# Patient Record
Sex: MALE | Race: WHITE | NOT HISPANIC OR LATINO | URBAN - METROPOLITAN AREA
[De-identification: names, ages, dates, MRNs, and addresses within clinical notes are randomized per-mention and may not be internally consistent; named-entity substitution may affect disease eponyms.]

---

## 2024-04-16 PROBLEM — M26.609 TMJ (TEMPOROMANDIBULAR JOINT DISORDER): Status: ACTIVE | Noted: 2024-04-16

## 2024-04-16 PROBLEM — J01.90 ACUTE SINUSITIS: Status: ACTIVE | Noted: 2024-04-16

## 2024-04-16 PROBLEM — H72.92 TYMPANIC MEMBRANE PERFORATION, LEFT: Status: ACTIVE | Noted: 2024-04-16

## 2024-04-16 PROBLEM — H90.72 MIXED HEARING LOSS OF LEFT EAR: Status: ACTIVE | Noted: 2024-04-16

## 2024-04-16 PROBLEM — H69.92 EUSTACHIAN TUBE DYSFUNCTION, LEFT: Status: ACTIVE | Noted: 2024-04-16

## 2024-04-16 PROBLEM — H74.12: Status: ACTIVE | Noted: 2024-04-16

## 2024-04-26 NOTE — PROGRESS NOTES
Subjective   Patient ID: Samy Mcclure is a 38 y.o. male who presents for No chief complaint on file..  HPI  This 38-year-old male had been followed in this office for a number of years due to eustachian tube difficulties requiring myringotomy tube placements.  Seen by neurotology in 2016 because of the his difficulties with eustachian tube problems.  A perforation of the tympanic membrane on the left-hand side had occurred because of recurrent tube use but self repair..  Surgical scar care had been discussed but was felt at that time to be unnecessary unless problems worsened regarding eustachian tube function. His last audiogram in this office from 2016 revealed evidence on the left-hand side for a mostly moderate mixed hearing loss with previous testing showing intact discrimination ability.  And on the right-hand side last tested in 2015 there was normal hearing with normal discrimination.  Review of Systems   A 12 point ROS has been reviewed and are negative for complaint except what is stated in the history of present illness and/or past medical history as noted in the EMR    Active Ambulatory Problems     Diagnosis Date Noted    Acute sinusitis 04/16/2024    Adhesive otitis media, left 04/16/2024    Eustachian tube dysfunction, left 04/16/2024    Mixed hearing loss of left ear 04/16/2024    TMJ (temporomandibular joint disorder) 04/16/2024    Tympanic membrane perforation, left 04/16/2024     Resolved Ambulatory Problems     Diagnosis Date Noted    No Resolved Ambulatory Problems     Past Medical History:   Diagnosis Date    Mixed conductive and sensorineural hearing loss, unilateral, left ear, with unrestricted hearing on the contralateral side 09/02/2016    Personal history of other diseases of the nervous system and sense organs     Personal history of other mental and behavioral disorders         No current outpatient medications on file.     Social History     Tobacco Use    Smoking status: Not on file     Smokeless tobacco: Not on file   Substance Use Topics    Alcohol use: Not on file        Past Surgical History:   Procedure Laterality Date    KNEE SURGERY  07/16/2015    Knee Surgery    OTHER SURGICAL HISTORY  07/16/2015    Ear Pressure Equalization Tube, Insertion    TONSILLECTOMY  07/16/2015    Tonsillectomy With Adenoidectomy        Allergies   Allergen Reactions    Hydrocodone-Acetaminophen Rash        There were no vitals taken for this visit.   Objective   Physical Exam  Examination:    CONSTITUTIONAL: Alert, in no acute distress, normal pitch/clarity of voice, well-developed, well-nourished, cooperative.  HEAD/FACE: Normocephalic, atraumatic, no tenderness over the sinuses, facial strength and movement symmetric.    SKIN: Good turgor, no rashes, no suspicious lesions, in the head and neck.    EYES: Both eyes have normal extraocular movements with no nystagmus, pupils are equal and reactive to light and accommodation, conjunctiva is clear.    EARS: Both ears are negative for external skin abnormalities, external auditory canals are without lesions or signs of inflammation, tympanic membranes are intact and are of normal color and texture, no effusions are seen, light reflexes normal, no mastoid tenderness is noted to palpation, objective hearing is intact.    NOSE: No external skin lesions are noted, nares are patent, septum is intact and noninflamed, nasal turbinates are normal in appearance, sinuses are nontender to palpation bilaterally, no internal lesions or polyps are noted, no discharge is noted.    OROPHARYNX/ORAL CAVITY: Mucous membranes of the oropharynx and the oral cavity proper are without lesions or ulcerations, tongue mobility is normal and no lesions are noted, gingiva and alveolar mucosa is intact without lesions, oral mucosa is moist, muscular movement of the palate and gag reflex are normal.    NASOPHARYNX: Mucous membranes are noninflamed and no secretions or lesions are noted.    LARYNX:  No mucosal inflammation or exudates are noted, arytenoids are normal in appearance and mobility, false vocal cords are without lesions as is the remainder of the supraglottic larynx, true vocal folds are mobile without inflammation or obstructions and no masses or lesions are noted in the endolarynx.    NECK: No lymphadenopathy is palpated, neck is supple with full range of motion, thyroid is without swelling or tenderness, trachea is midline, no neck masses are noted.    Lymphatics: No cervical adenopathy or supraclavicular adenopathy noted to palpation.    HEART/VASCULAR: No jugular venous distention is noted, carotid pulsations are intact with a regular rate and rhythm noted,    PULMONARY: Good air movement with normal inspiratory/expiratory effort is noted, no audible wheezing is appreciated.    NEUROLOGIC: Alert and oriented, cranial nerves are grossly intact, gait is normal, sensation in the head and neck is intact,    PSYCH: oriented to person, place and time, normal mood and affect.    EXTREMITIES: No motor dysfunction of the upper and lower extremity is noted.  Assessment/Plan   {Assess/PlanSmartLinks:60347}         Valentino Bennett DMD, MD 04/26/24 10:51 AM

## 2024-04-30 ENCOUNTER — APPOINTMENT (OUTPATIENT)
Dept: OTOLARYNGOLOGY | Facility: CLINIC | Age: 39
End: 2024-04-30
Payer: COMMERCIAL

## 2024-04-30 ENCOUNTER — APPOINTMENT (OUTPATIENT)
Dept: AUDIOLOGY | Facility: CLINIC | Age: 39
End: 2024-04-30
Payer: COMMERCIAL